# Patient Record
Sex: MALE | Race: WHITE
[De-identification: names, ages, dates, MRNs, and addresses within clinical notes are randomized per-mention and may not be internally consistent; named-entity substitution may affect disease eponyms.]

---

## 2019-07-10 ENCOUNTER — HOSPITAL ENCOUNTER (OUTPATIENT)
Dept: HOSPITAL 53 - M SDC | Age: 44
Discharge: HOME | End: 2019-07-10
Attending: OPHTHALMOLOGY
Payer: COMMERCIAL

## 2019-07-10 VITALS — BODY MASS INDEX: 31.44 KG/M2 | HEIGHT: 74 IN | WEIGHT: 245 LBS

## 2019-07-10 VITALS — DIASTOLIC BLOOD PRESSURE: 73 MMHG | SYSTOLIC BLOOD PRESSURE: 113 MMHG

## 2019-07-10 DIAGNOSIS — H25.811: Primary | ICD-10-CM

## 2019-07-10 PROCEDURE — 92015 DETERMINE REFRACTIVE STATE: CPT

## 2019-07-10 PROCEDURE — 66984 XCAPSL CTRC RMVL W/O ECP: CPT

## 2019-07-11 NOTE — RO
DATE OF PROCEDURE:  07/10/2019

 

PREOPERATIVE DIAGNOSIS:  Mature cataract, status post trauma, right eye.

 

POSTOPERATIVE DIAGNOSIS:  Mature cataract, status post trauma, right eye.

 

PROCEDURE PERFORMED:  Phacoemulsification and posterior chamber intraocular lens

implantation and capsule staining.

 

SURGEON:  Anna Aguila MD

 

ASSISTANT:

 

ANESTHESIA:  Topical with sedation.

 

DESCRIPTION OF PROCEDURE:  The patient was prepped and draped in the usual

fashion.  A lid speculum was placed between the lids.  The eye was fixated.  A

stab incision was made into the anterior chamber.  1% nonpreserved lidocaine was

instilled, and viscoelastic was instilled.  The VisionBlue was injected

underneath the viscoelastic, and the capsule was stained with the VisionBlue.

Fresh Healon was instilled to remove the VisionBlue.  Cystotome was used to begin

a capsulorrhexis.  Capsulorrhexis was carried out in a circular fashion.

However, superiorly, the ____________________ tore when just touched, and there

was a large amount of whitish fluid from the hypermature cataract into the

anterior chamber ____________________.  The phacoemulsification unit was placed

into the eye, and the anterior chamber was cleared out.  The lens was easily

grooved in two meridians and cracked.  Each section of the lens was removed with

the phacoemulsification unit.   Any remaining cortex was removed with the I and

A.  The capsular bag was refilled with viscoelastic, and the posterior chamber

intraocular lens was placed into the capsular bag with haptics at the 3 o'clock

and 9 o'clock position.  Any remaining viscoelastic was removed.  Miochol and

cefuroxime were instilled.  The wound was watertight.

 

The patient tolerated the procedure well and went to recovery in stable

condition.

 

At the time of surgery, when looking through into the vitreous cavity with the

microscope, there appeared to be some old blood or pigment in the vitreous

cavity, most likely secondary to the old trauma.  This was relayed to the

patient.

 

The patient tolerated the procedure well and went to the recovery room in stable

condition.